# Patient Record
Sex: FEMALE | Race: WHITE | Employment: OTHER | URBAN - METROPOLITAN AREA
[De-identification: names, ages, dates, MRNs, and addresses within clinical notes are randomized per-mention and may not be internally consistent; named-entity substitution may affect disease eponyms.]

---

## 2020-08-24 ENCOUNTER — HOSPITAL ENCOUNTER (OUTPATIENT)
Dept: DIABETES SERVICES | Age: 60
Setting detail: THERAPIES SERIES
Discharge: HOME | End: 2020-08-24
Payer: COMMERCIAL

## 2020-08-24 VITALS — TEMPERATURE: 98.6 F

## 2020-08-24 PROCEDURE — G0109 DIAB MANAGE TRN IND/GROUP: HCPCS | Performed by: DIETITIAN, REGISTERED

## 2020-08-24 SDOH — ECONOMIC STABILITY: FOOD INSECURITY: ADDITIONAL INFORMATION: NO

## 2020-08-24 ASSESSMENT — PATIENT HEALTH QUESTIONNAIRE - PHQ9
2. FEELING DOWN, DEPRESSED OR HOPELESS: 0
SUM OF ALL RESPONSES TO PHQ9 QUESTIONS 1 & 2: 0
SUM OF ALL RESPONSES TO PHQ QUESTIONS 1-9: 0
1. LITTLE INTEREST OR PLEASURE IN DOING THINGS: 0
SUM OF ALL RESPONSES TO PHQ QUESTIONS 1-9: 0

## 2020-08-24 NOTE — PROGRESS NOTES
Demonstrate proper technique        Monitoring blood glucose, interpreting and using results:   -Identify recommended & personal blood glucose targets & HgbA1C target levels  -State the Importance of logging blood glucose levels for pattern recognition;   -State benefits of reading/using pt generated health data  -Verbalize safe lancet disposal 1 8/24/2020  4 Pt states not currently ordered to test, but has a glucose meter and testing supplies at home. She does occasionally check her glucose level.     -Demonstrate proper testing technique        Incorporating physical activity into lifestyle:   -State effect of exercise on blood glucose levels;   -State benefits of regular exercise;   -Define safety considerations;  -Describe contraindications/maintenance of activity. 1 8/24/2020  4 Pt states physically active 3x/week for 30-60 minutes/day--golfing, playing pool, walking. Incorporating nutritional management into lifestyle:   -Describe effect of type, amount & timing of food on blood glucose  -Describe methods for preparing and planning healthy meals  Correctly read food labels  8/24/2020      Plan personal carbohydrate-controlled meal based on individualized meal plan  Demonstrate CHO counting/portion control         Developing strategies for problem solving to promote health/change behavior. -Identify 7 self-care behaviors; Personal health risk factors; Benefits, challenges & strategies for behavioral change and set an individualized goal selection. 1 8/24/2020  4 Goal:  I will use the plate method at each meal to reduce my portions.       Identified Barriers to learning/adherence to self management plan:    None  []  other    Instruction Method:  Lecture/Discussion, Handouts and Return demonstration     Supporting Education Materials/Equipment Provided: Educational Binder and Nutritional Packet   []South African materials       [] services     []Other:      Encounter Type Date Attended Start Time End Time Comments No Show Dates   Assessment 8/24/2020 1330 1345       Session 1 8/24/2020 1345 1500      Session 2 8/24/2020 1500 1600     Session 3         In person Follow-up         Gestational Diabetes         Individual MNT        Meter Instrx        Insulin Instrx           Additional Comments:  Patient participated in class discussions. PCP notified of education via EMR.      Date:           DSMES Follow-up plan based on patients DSMES goal    Dr Notified by [] EMR []Fax        []HgbA1C   []Weight   []Hypertension  []Follow-up with Physician  []Medication compliance   []Plate method/meal plan compliance   []Self-Foot Exam Frequency   []Monitoring Frequency   []Exercise Routine   []Goal Attainment       []Patient lost to follow-up  Dr Notified by []EMR []Fax     Personal Support Plan:      [x] Keep all scheduled doctor appointments   [] Make and keep appointments with specialists (foot, eye, dentist) as recommended   [] Consult my pharmacist about all new medications or to ask any medication questions   [] Get tested for sleep apnea   [] Seek help for:   [] Make an appointment with:   [] Attend smoking cessation classes or call 1-800-QUIT-NOW  [] Attend Diabetes Support Group   [] Use diabetes magazines, books, or credible web-sites like the ADA for more information  [] Increase exercise at home or join an exercise program:   [] Other:

## 2020-08-24 NOTE — LETTER
[facility])- Diabetes Education    2020       Re:     Jody Bautista         :  1960  Dear Dr. Juan Jean: Thank you for referring your patient, Jody Bautista, for diabetes education. Your patient has completed her personalized initial comprehensive education plan. Your patient attended class on 20 that addressed the following topics:    Nursing/Medical [x]      Nutrition [x]  [] Diabetes disease process & treatment   [] Diabetes medication use and safety   [] Self-monitoring blood glucose/interpreting results  [] Prevention, detection and treatment of acute complications  [] Prevention, detection and treatment of chronic complications  [] Developing strategies to address psychosocial issues    [] Nutritional management: basic principles   [] Carbohydrate counting, plate method, label reading  [] Benefits of/ways to incorporate physical activity  [] Problem solving and preparing for crisis situations  [] Diabetes supply management  [] Goal setting and behavior modification       PHQ-9 Depression Screen Score:  0  0-4: Minimal Depression                5-9: Mild Depression    10-14: Moderate Depression  15-19: Moderately Severe Depression  20-27: Severe Depression     COMMENTS:      PATIENT SELECTED GOAL:   [x]  I will follow my meal plan and measure my carbohydrate foods. []  I will increase my activity to:  []  I will check my blood glucose as ordered by my doctor. []  I will take my medications at the correct times as ordered by my doctor.   []  Other:      DIABETES SELF-MANAGEMENT SUPPORT PLAN/REFERRALS (patient identified):  [x] Keep my scheduled visits with my doctor   [] Make and keep appointments with specialists (foot, eye, dentist) as recommended  [] Consult my pharmacist with all new medications and/or any medication questions  [] Get tested for sleep apnea  [] Seek help for:   [] Make an appointment with: [] Attend smoking cessation classes or call help-line (5-613-QUIT-NOW; 773.960.1176)  [] Attend a diabetes support group  [] Use diabetes magazines, books, or the American Diabetes Association website (www.diabetes. org) for more information    [] Start or increase exercising at home or join a program:  [] Other: There will be a follow-up in 3 months to evaluate the attainment of their chosen goal, and self identified support plan and you will be notified of their progress. Thank you for referring this patient to our program.  Please do not hesitate to call if you have any questions at [phone] [facility] or [facility]) or [phone] ([address] [place] [address]).         Sincerely,    Diabetes Educators:     []  JEAN Ortiz      [x]  Heber Real RN BSN ALEYDA     [x]  RICHARD Bonner         []  Lakhwinder Pepper MS RICHARD LD   []  Mike EVERETT  []  RICHARD West        Diabetes

## 2020-08-24 NOTE — PROGRESS NOTES
Diabetes Self-Management Education Record    Participant Name: Ligia Grace  Referring Provider: Justo Garza MD  Assessment/Evaluation Ratings:  1=Needs Instruction   4=Demonstrates Understanding/Competency  2=Needs Review   NC=Not Covered    3=Comprehends Key Points  N/A=Not Applicable  Topics/Learning Objectives Pre-session Assess Date:  8/24/2020 Instr. Date Follow-up Date Post- session Eval Comments   Diabetes disease process & Treatment process:   -Define diabetes & prediabetes and ABCs of     diabetes   -Identify own type of diabetes  -Identify lifestyle changes/treatment options 1 8/24/2020  4 Type 2, A1C 7%  Family History   Developing strategies for Healthy coping/psychosocial issues:    -Describe feelings about living with diabetes  -Identify coping strategies;   -Identify support needed & support network available 1 8/24/2020  4     8/24/20  PHQ-2 Depression Screen Score: 0       Prevention, detection & treatment of Chronic complications:    -Identify the prevention, detection and treatment for complications including immunizations, preventive eye, foot, dental and renal exams as indicated per the participant's duration of diabetes and health status.  -Define the natural course of diabetes and the relationship of blood glucose levels to long term complications of diabetes.   1 8/24/2020  4    Prevention, detection & treatment of acute complications:    -List symptoms of hyper & hypoglycemia, and prevention & treatment strategies.   -Describe sick day guidelines  DKA /indications for ketone testing &  when to call physician  1 8/24/2020  4    Identify severe weather/situation crisis  & diabetes supplies management 1 8/24/2020  4    Using medications safely:   -State effects of diabetes medicines on blood glucose levels;  -List diabetes medication taken, action & side effects 1 8/24/2020  4 Metformin 1000 mg BID   Insulin/Injectables  -Name appropriate injection sites; proper storage; supplies needed;  N/A Demonstrate proper technique        Monitoring blood glucose, interpreting and using results:   -Identify recommended & personal blood glucose targets & HgbA1C target levels  -State the Importance of logging blood glucose levels for pattern recognition;   -State benefits of reading/using pt generated health data  -Verbalize safe lancet disposal 1 8/24/2020  4 Pt states not currently ordered to test, but has a glucose meter and testing supplies at home. She does occasionally check her glucose level.     -Demonstrate proper testing technique        Incorporating physical activity into lifestyle:   -State effect of exercise on blood glucose levels;   -State benefits of regular exercise;   -Define safety considerations;  -Describe contraindications/maintenance of activity. 1 8/24/2020  4 Pt states physically active 3x/week for 30-60 minutes/day--golfing, playing pool, walking. Incorporating nutritional management into lifestyle:   -Describe effect of type, amount & timing of food on blood glucose  -Describe methods for preparing and planning healthy meals  Correctly read food labels  8/24/2020  3 Discussed general healthy eating guidelines, plate method, food groups high in carbohydrate, portion control and timing of meals. Plan personal carbohydrate-controlled meal based on individualized meal plan  Demonstrate CHO counting/portion control         Developing strategies for problem solving to promote health/change behavior. -Identify 7 self-care behaviors; Personal health risk factors; Benefits, challenges & strategies for behavioral change and set an individualized goal selection. 1 8/24/2020  4 Goal:  I will use the plate method at each meal to reduce my portions.       Identified Barriers to learning/adherence to self management plan:    None  []  other    Instruction Method:  Lecture/Discussion, Handouts and Return demonstration     Supporting Education Materials/Equipment Provided: Educational Binder and Nutritional Packet   [][place] materials       [] services     []Other:      Encounter Type Date Attended Start Time End Time Comments No Show Dates   Assessment 8/24/2020 1330 1345       Session 1 8/24/2020 1345 1500      Session 2 8/24/2020 1500 1600     Session 3         In person Follow-up         Gestational Diabetes         Individual MNT        Meter Instrx        Insulin Instrx           Additional Comments:  Patient participated in class discussions. PCP notified of education via EMR.      Date:           DSMES Follow-up plan based on patients DSMES goal    Dr Notified by [] EMR []Fax        []HgbA1C   []Weight   []Hypertension  []Follow-up with Physician  []Medication compliance   []Plate method/meal plan compliance   []Self-Foot Exam Frequency   []Monitoring Frequency   []Exercise Routine   []Goal Attainment       []Patient lost to follow-up  Dr Notified by []EMR []Fax     Personal Support Plan:      [x] Keep all scheduled doctor appointments   [] Make and keep appointments with specialists (foot, eye, dentist) as recommended   [] Consult my pharmacist about all new medications or to ask any medication questions   [] Get tested for sleep apnea   [] Seek help for:   [] Make an appointment with:   [] Attend smoking cessation classes or call 5-800-QUIT-NOW  [] Attend Diabetes Support Group   [] Use diabetes magazines, books, or credible web-sites like the ADA for more information  [] Increase exercise at home or join an exercise program:   [] Other:

## 2020-08-24 NOTE — PROGRESS NOTES
Diabetes Self-Management Education Record    Participant Name: Reyna Bonilla  Referring Provider: Shelby Gandara MD  Assessment/Evaluation Ratings:  1=Needs Instruction   4=Demonstrates Understanding/Competency  2=Needs Review   NC=Not Covered    3=Comprehends Key Points  N/A=Not Applicable  Topics/Learning Objectives Pre-session Assess Date:  8/24/2020 Instr. Date Follow-up Date Post- session Eval Comments   Diabetes disease process & Treatment process:   -Define diabetes & prediabetes and ABCs of     diabetes   -Identify own type of diabetes  -Identify lifestyle changes/treatment options 1 8/24/2020   Type 2  Family History   Developing strategies for Healthy coping/psychosocial issues:    -Describe feelings about living with diabetes  -Identify coping strategies;   -Identify support needed & support network available 1 8/24/2020         PHQ-2 Depression Screen Score: 0       Prevention, detection & treatment of Chronic complications:    -Identify the prevention, detection and treatment for complications including immunizations, preventive eye, foot, dental and renal exams as indicated per the participant's duration of diabetes and health status.  -Define the natural course of diabetes and the relationship of blood glucose levels to long term complications of diabetes.    8/24/2020      Prevention, detection & treatment of acute complications:    -List symptoms of hyper & hypoglycemia, and prevention & treatment strategies.   -Describe sick day guidelines  DKA /indications for ketone testing &  when to call physician   8/24/2020      Identify severe weather/situation crisis  & diabetes supplies management  8/24/2020      Using medications safely:   -State effects of diabetes medicines on blood glucose levels;  -List diabetes medication taken, action & side effects  8/24/2020   Metformin   Insulin/Injectables  -Name appropriate injection sites; proper storage; supplies needed;          Demonstrate proper technique Monitoring blood glucose, interpreting and using results:   -Identify recommended & personal blood glucose targets & HgbA1C target levels  -State the Importance of logging blood glucose levels for pattern recognition;   -State benefits of reading/using pt generated health data  -Verbalize safe lancet disposal  8/24/2020   Pt states not currently testing  A1C 7%   -Demonstrate proper testing technique        Incorporating physical activity into lifestyle:   -State effect of exercise on blood glucose levels;   -State benefits of regular exercise;   -Define safety considerations;  -Describe contraindications/maintenance of activity. 1 8/24/2020   Pt states physically active 3x/week for 30-60 minutes/day. Incorporating nutritional management into lifestyle:   -Describe effect of type, amount & timing of food on blood glucose  -Describe methods for preparing and planning healthy meals  Correctly read food labels  8/24/2020      Plan personal carbohydrate-controlled meal based on individualized meal plan  Demonstrate CHO counting/portion control         Developing strategies for problem solving to promote health/change behavior. -Identify 7 self-care behaviors; Personal health risk factors; Benefits, challenges & strategies for behavioral change and set an individualized goal selection.   8/24/2020   Goal:     Identified Barriers to learning/adherence to self management plan:    None  []  other    Instruction Method:  Lecture/Discussion, Handouts and Return demonstration     Supporting Education Materials/Equipment Provided: Educational Binder and Nutritional Packet   []Lithuanian materials       [] services     []Other:      Encounter Type Date Attended Start Time End Time Comments No Show Dates   Assessment 8/24/2020 1330        Session 1         Session 2        Session 3         In person Follow-up         Gestational Diabetes         Individual MNT        Meter Instrx        Insulin Instrx Additional Comments:    Date:           DSMES Follow-up plan based on patients DSMES goal    Dr Notified by [] EMR []Fax        []HgbA1C   []Weight   []Hypertension  []Follow-up with Physician  []Medication compliance   []Plate method/meal plan compliance   []Self-Foot Exam Frequency   []Monitoring Frequency   []Exercise Routine   []Goal Attainment       []Patient lost to follow-up  Dr Notified by []EMR []Fax     Personal Support Plan:      [x] Keep all scheduled doctor appointments   [] Make and keep appointments with specialists (foot, eye, dentist) as recommended   [] Consult my pharmacist about all new medications or to ask any medication questions   [] Get tested for sleep apnea   [] Seek help for:   [] Make an appointment with:   [] Attend smoking cessation classes or call 1-800-QUIT-NOW  [] Attend Diabetes Support Group   [] Use diabetes magazines, books, or credible web-sites like the ADA for more information  [] Increase exercise at home or join an exercise program:   [] Other:

## 2021-04-10 ENCOUNTER — APPOINTMENT (OUTPATIENT)
Dept: GENERAL RADIOLOGY | Age: 61
End: 2021-04-10
Payer: COMMERCIAL

## 2021-04-10 ENCOUNTER — HOSPITAL ENCOUNTER (EMERGENCY)
Age: 61
Discharge: HOME | End: 2021-04-10
Attending: EMERGENCY MEDICINE
Payer: COMMERCIAL

## 2021-04-10 ENCOUNTER — APPOINTMENT (OUTPATIENT)
Dept: CT IMAGING | Age: 61
End: 2021-04-10
Payer: COMMERCIAL

## 2021-04-10 VITALS
BODY MASS INDEX: 24.35 KG/M2 | OXYGEN SATURATION: 95 % | WEIGHT: 124 LBS | TEMPERATURE: 97.3 F | DIASTOLIC BLOOD PRESSURE: 104 MMHG | RESPIRATION RATE: 16 BRPM | SYSTOLIC BLOOD PRESSURE: 181 MMHG | HEART RATE: 70 BPM | HEIGHT: 60 IN

## 2021-04-10 DIAGNOSIS — K76.89 HEPATIC CYST: ICD-10-CM

## 2021-04-10 DIAGNOSIS — R07.9 RIGHT-SIDED CHEST PAIN: Primary | ICD-10-CM

## 2021-04-10 LAB
ALBUMIN SERPL-MCNC: 4.3 G/DL (ref 3.5–5.2)
ALP BLD-CCNC: 73 U/L (ref 35–104)
ALT SERPL-CCNC: 9 U/L (ref 0–32)
ANION GAP SERPL CALCULATED.3IONS-SCNC: 13 MMOL/L (ref 7–16)
AST SERPL-CCNC: 14 U/L (ref 0–31)
BASOPHILS ABSOLUTE: 0.05 E9/L (ref 0–0.2)
BASOPHILS RELATIVE PERCENT: 0.5 % (ref 0–2)
BILIRUB SERPL-MCNC: <0.2 MG/DL (ref 0–1.2)
BUN BLDV-MCNC: 17 MG/DL (ref 8–23)
CALCIUM SERPL-MCNC: 9.8 MG/DL (ref 8.6–10.2)
CHLORIDE BLD-SCNC: 103 MMOL/L (ref 98–107)
CO2: 26 MMOL/L (ref 22–29)
CREAT SERPL-MCNC: 0.8 MG/DL (ref 0.5–1)
D DIMER: 295 NG/ML DDU
EKG ATRIAL RATE: 72 BPM
EKG P AXIS: 59 DEGREES
EKG P-R INTERVAL: 182 MS
EKG Q-T INTERVAL: 412 MS
EKG QRS DURATION: 74 MS
EKG QTC CALCULATION (BAZETT): 451 MS
EKG R AXIS: 16 DEGREES
EKG T AXIS: 37 DEGREES
EKG VENTRICULAR RATE: 72 BPM
EOSINOPHILS ABSOLUTE: 0.14 E9/L (ref 0.05–0.5)
EOSINOPHILS RELATIVE PERCENT: 1.4 % (ref 0–6)
GFR AFRICAN AMERICAN: >60
GFR NON-AFRICAN AMERICAN: >60 ML/MIN/1.73
GLUCOSE BLD-MCNC: 119 MG/DL (ref 74–99)
HCT VFR BLD CALC: 38 % (ref 34–48)
HEMOGLOBIN: 12.3 G/DL (ref 11.5–15.5)
IMMATURE GRANULOCYTES #: 0.04 E9/L
IMMATURE GRANULOCYTES %: 0.4 % (ref 0–5)
LIPASE: 39 U/L (ref 13–60)
LYMPHOCYTES ABSOLUTE: 1.62 E9/L (ref 1.5–4)
LYMPHOCYTES RELATIVE PERCENT: 16.7 % (ref 20–42)
MCH RBC QN AUTO: 25.5 PG (ref 26–35)
MCHC RBC AUTO-ENTMCNC: 32.4 % (ref 32–34.5)
MCV RBC AUTO: 78.8 FL (ref 80–99.9)
MONOCYTES ABSOLUTE: 0.51 E9/L (ref 0.1–0.95)
MONOCYTES RELATIVE PERCENT: 5.3 % (ref 2–12)
NEUTROPHILS ABSOLUTE: 7.32 E9/L (ref 1.8–7.3)
NEUTROPHILS RELATIVE PERCENT: 75.7 % (ref 43–80)
PDW BLD-RTO: 15.4 FL (ref 11.5–15)
PLATELET # BLD: 371 E9/L (ref 130–450)
PMV BLD AUTO: 11.1 FL (ref 7–12)
POTASSIUM REFLEX MAGNESIUM: 3.9 MMOL/L (ref 3.5–5)
RBC # BLD: 4.82 E12/L (ref 3.5–5.5)
SODIUM BLD-SCNC: 142 MMOL/L (ref 132–146)
TOTAL PROTEIN: 6.8 G/DL (ref 6.4–8.3)
WBC # BLD: 9.7 E9/L (ref 4.5–11.5)

## 2021-04-10 PROCEDURE — 96375 TX/PRO/DX INJ NEW DRUG ADDON: CPT

## 2021-04-10 PROCEDURE — 85025 COMPLETE CBC W/AUTO DIFF WBC: CPT

## 2021-04-10 PROCEDURE — 71275 CT ANGIOGRAPHY CHEST: CPT

## 2021-04-10 PROCEDURE — 6360000004 HC RX CONTRAST MEDICATION: Performed by: RADIOLOGY

## 2021-04-10 PROCEDURE — 96374 THER/PROPH/DIAG INJ IV PUSH: CPT

## 2021-04-10 PROCEDURE — 83690 ASSAY OF LIPASE: CPT

## 2021-04-10 PROCEDURE — 6360000002 HC RX W HCPCS: Performed by: EMERGENCY MEDICINE

## 2021-04-10 PROCEDURE — 93005 ELECTROCARDIOGRAM TRACING: CPT | Performed by: EMERGENCY MEDICINE

## 2021-04-10 PROCEDURE — 71045 X-RAY EXAM CHEST 1 VIEW: CPT

## 2021-04-10 PROCEDURE — 93010 ELECTROCARDIOGRAM REPORT: CPT | Performed by: INTERNAL MEDICINE

## 2021-04-10 PROCEDURE — 99283 EMERGENCY DEPT VISIT LOW MDM: CPT

## 2021-04-10 PROCEDURE — 80053 COMPREHEN METABOLIC PANEL: CPT

## 2021-04-10 PROCEDURE — 85378 FIBRIN DEGRADE SEMIQUANT: CPT

## 2021-04-10 RX ORDER — ORPHENADRINE CITRATE 30 MG/ML
30 INJECTION INTRAMUSCULAR; INTRAVENOUS ONCE
Status: COMPLETED | OUTPATIENT
Start: 2021-04-10 | End: 2021-04-10

## 2021-04-10 RX ORDER — KETOROLAC TROMETHAMINE 30 MG/ML
15 INJECTION, SOLUTION INTRAMUSCULAR; INTRAVENOUS ONCE
Status: COMPLETED | OUTPATIENT
Start: 2021-04-10 | End: 2021-04-10

## 2021-04-10 RX ORDER — KETOROLAC TROMETHAMINE 10 MG/1
10 TABLET, FILM COATED ORAL EVERY 8 HOURS PRN
Qty: 15 TABLET | Refills: 0 | Status: SHIPPED | OUTPATIENT
Start: 2021-04-10 | End: 2022-09-13 | Stop reason: ALTCHOICE

## 2021-04-10 RX ORDER — ORPHENADRINE CITRATE 100 MG/1
100 TABLET, EXTENDED RELEASE ORAL 2 TIMES DAILY
Qty: 20 TABLET | Refills: 0 | Status: SHIPPED | OUTPATIENT
Start: 2021-04-10 | End: 2021-04-20

## 2021-04-10 RX ADMIN — IOPAMIDOL 65 ML: 755 INJECTION, SOLUTION INTRAVENOUS at 06:33

## 2021-04-10 RX ADMIN — KETOROLAC TROMETHAMINE 15 MG: 30 INJECTION, SOLUTION INTRAMUSCULAR; INTRAVENOUS at 05:00

## 2021-04-10 RX ADMIN — ORPHENADRINE CITRATE 30 MG: 30 INJECTION INTRAMUSCULAR; INTRAVENOUS at 05:00

## 2021-04-10 ASSESSMENT — PAIN SCALES - GENERAL
PAINLEVEL_OUTOF10: 7
PAINLEVEL_OUTOF10: 7

## 2021-04-10 ASSESSMENT — PAIN DESCRIPTION - PAIN TYPE: TYPE: ACUTE PAIN

## 2021-04-10 NOTE — ED PROVIDER NOTES
identified pulmonary embolism. Mild dependent atelectasis in the lungs. Partial visualization of low-density hepatic and renal lesions. These may   represent multiple cysts. However, for more definitive assessment, follow-up   hepatic mass protocol CT could be performed on a non-emergent basis. Alternatively, comparison with any previous exam would be useful. XR CHEST PORTABLE   Final Result   No acute cardiopulmonary findings. PA and lateral views would be useful for   further assessment, if symptoms persist.             ------------------------- NURSING NOTES AND VITALS REVIEWED ---------------------------   The nursing notes within the ED encounter and vital signs as below have been reviewed. BP (!) 181/104   Pulse 70   Temp 97.3 °F (36.3 °C)   Resp 16   Ht 5' (1.524 m)   Wt 124 lb (56.2 kg)   LMP 07/09/2009   SpO2 95%   BMI 24.22 kg/m²   Oxygen Saturation Interpretation: Normal      ---------------------------------------------------PHYSICAL EXAM--------------------------------------      Constitutional/General: Alert and oriented x3, well appearing, non toxic in mild distress secondary to pain  Head: NC/AT  Eyes: PERRL, EOMI  Mouth: Oropharynx clear, handling secretions, no trismus  Neck: Supple, full ROM, no meningeal signs  Pulmonary: Lungs clear to auscultation bilaterally, no wheezes, rales, or rhonchi. Not in respiratory distress  Cardiovascular:  Regular rate and rhythm, no murmurs, gallops, or rubs. 2+ distal pulses  Abdomen: Soft, non tender, non distended,   Extremities: Moves all extremities x 4.  Warm and well perfused  Skin: warm and dry without rash  Neurologic: GCS 15,  Psych: Normal Affect  Right shoulder mild tenderness to palpation over the right rhomboid region but not worse with full range of motion    ------------------------------ ED COURSE/MEDICAL DECISION MAKING----------------------  Medications   ketorolac (TORADOL) injection 15 mg (15 mg Intravenous Given 4/10/21 0500)   orphenadrine (NORFLEX) injection 30 mg (30 mg Intravenous Given 4/10/21 0500)   iopamidol (ISOVUE-370) 76 % injection 65 mL (65 mLs Intravenous Given 4/10/21 5767)         Medical Decision Making:    Suspected scapulothoracic syndrome but rule out PE because she has increased pain with breathing    Counseling: The emergency provider has spoken with the patient and discussed todays results, in addition to providing specific details for the plan of care and counseling regarding the diagnosis and prognosis. Questions are answered at this time and they are agreeable with the plan.      --------------------------------- IMPRESSION AND DISPOSITION ---------------------------------    IMPRESSION  1. Right-sided chest pain    2.  Hepatic cyst        DISPOSITION  Disposition: Discharge to home  Patient condition is stable                  Rick Alegria MD  04/12/21 0931

## 2021-04-15 ENCOUNTER — HOSPITAL ENCOUNTER (EMERGENCY)
Age: 61
Discharge: HOME | End: 2021-04-15
Attending: EMERGENCY MEDICINE
Payer: COMMERCIAL

## 2021-04-15 ENCOUNTER — APPOINTMENT (OUTPATIENT)
Dept: GENERAL RADIOLOGY | Age: 61
End: 2021-04-15
Payer: COMMERCIAL

## 2021-04-15 VITALS
WEIGHT: 116 LBS | RESPIRATION RATE: 16 BRPM | HEART RATE: 68 BPM | TEMPERATURE: 98.7 F | BODY MASS INDEX: 22.78 KG/M2 | SYSTOLIC BLOOD PRESSURE: 197 MMHG | DIASTOLIC BLOOD PRESSURE: 83 MMHG | OXYGEN SATURATION: 97 % | HEIGHT: 60 IN

## 2021-04-15 DIAGNOSIS — M54.6 RIGHT-SIDED THORACIC BACK PAIN, UNSPECIFIED CHRONICITY: Primary | ICD-10-CM

## 2021-04-15 LAB
ANION GAP SERPL CALCULATED.3IONS-SCNC: 10 MMOL/L (ref 7–16)
BASOPHILS ABSOLUTE: 0.04 E9/L (ref 0–0.2)
BASOPHILS RELATIVE PERCENT: 0.5 % (ref 0–2)
BUN BLDV-MCNC: 24 MG/DL (ref 8–23)
CALCIUM SERPL-MCNC: 9.1 MG/DL (ref 8.6–10.2)
CHLORIDE BLD-SCNC: 103 MMOL/L (ref 98–107)
CO2: 27 MMOL/L (ref 22–29)
CREAT SERPL-MCNC: 1 MG/DL (ref 0.5–1)
EKG ATRIAL RATE: 76 BPM
EKG P AXIS: 49 DEGREES
EKG P-R INTERVAL: 182 MS
EKG Q-T INTERVAL: 430 MS
EKG QRS DURATION: 74 MS
EKG QTC CALCULATION (BAZETT): 483 MS
EKG R AXIS: 2 DEGREES
EKG T AXIS: 25 DEGREES
EKG VENTRICULAR RATE: 76 BPM
EOSINOPHILS ABSOLUTE: 0.2 E9/L (ref 0.05–0.5)
EOSINOPHILS RELATIVE PERCENT: 2.6 % (ref 0–6)
GFR AFRICAN AMERICAN: >60
GFR NON-AFRICAN AMERICAN: 56 ML/MIN/1.73
GLUCOSE BLD-MCNC: 119 MG/DL (ref 74–99)
HCT VFR BLD CALC: 36.6 % (ref 34–48)
HEMOGLOBIN: 11.8 G/DL (ref 11.5–15.5)
IMMATURE GRANULOCYTES #: 0.02 E9/L
IMMATURE GRANULOCYTES %: 0.3 % (ref 0–5)
LYMPHOCYTES ABSOLUTE: 1.8 E9/L (ref 1.5–4)
LYMPHOCYTES RELATIVE PERCENT: 23.8 % (ref 20–42)
MCH RBC QN AUTO: 25.2 PG (ref 26–35)
MCHC RBC AUTO-ENTMCNC: 32.2 % (ref 32–34.5)
MCV RBC AUTO: 78 FL (ref 80–99.9)
MONOCYTES ABSOLUTE: 0.6 E9/L (ref 0.1–0.95)
MONOCYTES RELATIVE PERCENT: 7.9 % (ref 2–12)
NEUTROPHILS ABSOLUTE: 4.91 E9/L (ref 1.8–7.3)
NEUTROPHILS RELATIVE PERCENT: 64.9 % (ref 43–80)
PDW BLD-RTO: 15.4 FL (ref 11.5–15)
PLATELET # BLD: 365 E9/L (ref 130–450)
PMV BLD AUTO: 11.2 FL (ref 7–12)
POTASSIUM REFLEX MAGNESIUM: 3.6 MMOL/L (ref 3.5–5)
RBC # BLD: 4.69 E12/L (ref 3.5–5.5)
SODIUM BLD-SCNC: 140 MMOL/L (ref 132–146)
TROPONIN: <0.01 NG/ML (ref 0–0.03)
WBC # BLD: 7.6 E9/L (ref 4.5–11.5)

## 2021-04-15 PROCEDURE — 85025 COMPLETE CBC W/AUTO DIFF WBC: CPT

## 2021-04-15 PROCEDURE — 71046 X-RAY EXAM CHEST 2 VIEWS: CPT

## 2021-04-15 PROCEDURE — 84484 ASSAY OF TROPONIN QUANT: CPT

## 2021-04-15 PROCEDURE — 80048 BASIC METABOLIC PNL TOTAL CA: CPT

## 2021-04-15 PROCEDURE — 99283 EMERGENCY DEPT VISIT LOW MDM: CPT

## 2021-04-15 PROCEDURE — 93005 ELECTROCARDIOGRAM TRACING: CPT | Performed by: EMERGENCY MEDICINE

## 2021-04-15 RX ORDER — METHYLPREDNISOLONE 4 MG/1
TABLET ORAL
Qty: 1 KIT | Status: SHIPPED | OUTPATIENT
Start: 2021-04-15 | End: 2021-04-21

## 2021-04-15 ASSESSMENT — PAIN SCALES - GENERAL: PAINLEVEL_OUTOF10: 7

## 2021-04-15 ASSESSMENT — ENCOUNTER SYMPTOMS
SHORTNESS OF BREATH: 0
DIARRHEA: 0
EYE PAIN: 0
EYE DISCHARGE: 0
COUGH: 0
ABDOMINAL DISTENTION: 0
ABDOMINAL PAIN: 0
HEARTBURN: 0
NAUSEA: 0
ORTHOPNEA: 0
SORE THROAT: 0
EYE REDNESS: 0
BACK PAIN: 1
VOMITING: 0
WHEEZING: 0
SINUS PRESSURE: 0

## 2021-04-15 ASSESSMENT — PAIN DESCRIPTION - LOCATION: LOCATION: BACK;CHEST

## 2021-04-15 NOTE — ED PROVIDER NOTES
53-year-old female presents to the emergency department with right-sided chest pain that she states began on Friday and was evaluated in the emergency department at that time for it. She had complete work-up including CTA of the chest that was found to be negative. Patient does have history of prediabetes, hyperlipidemia, hypertension. The patient states that she was concerned today because there is pain down the right side of her shoulder. She denies shortness of breath denies cough denies fevers denies nausea vomiting diarrhea abdominal pain urinary symptoms or leg swelling. She states she contacted her PCP who sent her in for further evaluation. The history is provided by the patient. Chest Pain  Pain location:  R chest  Pain quality: aching    Pain radiates to:  R shoulder  Pain severity:  Mild  Onset quality:  Gradual  Duration:  1 month  Timing:  Intermittent  Progression:  Waxing and waning  Chronicity:  New  Relieved by:  Nothing  Worsened by:  Nothing  Ineffective treatments:  None tried  Associated symptoms: back pain    Associated symptoms: no abdominal pain, no anxiety, no cough, no diaphoresis, no dizziness, no fatigue, no fever, no headache, no heartburn, no lower extremity edema, no nausea, no numbness, no orthopnea, no palpitations, no shortness of breath, no syncope, no vomiting and no weakness         Review of Systems   Constitutional: Negative for chills, diaphoresis, fatigue and fever. HENT: Negative for ear pain, sinus pressure and sore throat. Eyes: Negative for pain, discharge and redness. Respiratory: Negative for cough, shortness of breath and wheezing. Cardiovascular: Positive for chest pain. Negative for palpitations, orthopnea and syncope. Gastrointestinal: Negative for abdominal distention, abdominal pain, diarrhea, heartburn, nausea and vomiting. Genitourinary: Negative for dysuria and frequency. Musculoskeletal: Positive for back pain.  Negative for arthralgias. Skin: Negative for rash and wound. Neurological: Negative for dizziness, weakness, numbness and headaches. Hematological: Negative for adenopathy. All other systems reviewed and are negative. Physical Exam  Constitutional:       Appearance: Normal appearance. HENT:      Head: Normocephalic and atraumatic. Eyes:      Extraocular Movements: Extraocular movements intact. Pupils: Pupils are equal, round, and reactive to light. Cardiovascular:      Rate and Rhythm: Normal rate and regular rhythm. Pulses: Normal pulses. Heart sounds: Normal heart sounds. Pulmonary:      Breath sounds: Normal breath sounds. Abdominal:      General: Abdomen is flat. Bowel sounds are normal.      Palpations: Abdomen is soft. Musculoskeletal:        Back:    Neurological:      General: No focal deficit present. Mental Status: She is alert and oriented to person, place, and time. Procedures   EKG: This EKG is signed and interpreted by the EP. Time: 0:38  Rate: 76  Rhythm: Sinus  Interpretation: NSR  Comparison: stable as compared to patient's most recent EKG    MDM  Number of Diagnoses or Management Options  Right-sided thoracic back pain, unspecified chronicity  Diagnosis management comments: Patient seen and examined. Patient symptoms seem musculoskeletal related. I did review imaging and work-up done that was found to be reassuring. Patient already on muscle relaxers and anti-inflammatories.   After review of work-up was found to be reassuring patient was discharged with additional course of steroids she is recommended to follow-up with primary care physician for further work-up.             --------------------------------------------- PAST HISTORY ---------------------------------------------  Past Medical History:  has a past medical history of Abnormal glandular Papanicolaou smear of cervix, Diabetes mellitus ([facility] [address] History of colonoscopy, Hyperlipidemia, Hypertension, and Trauma. Past Surgical History:  has a past surgical history that includes Appendectomy; brain surgery; Abdominal exploration surgery; Colposcopy; external auditory canal reconstruction; Tonsillectomy; other surgical history; and Ovary removal (Right). Social History:  reports that she has been smoking cigarettes. She has never used smokeless tobacco. She reports that she does not drink alcohol or use drugs. Family History: family history includes High Cholesterol in her father and mother; Hypertension in her father and mother; Mult Sclerosis in her mother; Parkinsonism in her father; Stroke in her maternal grandmother and mother; Thyroid Disease in her mother. The patients home medications have been reviewed. Allergies: Patient has no known allergies.     -------------------------------------------------- RESULTS -------------------------------------------------  Labs:  Results for orders placed or performed during the hospital encounter of 04/15/21   CBC Auto Differential   Result Value Ref Range    WBC 7.6 4.5 - 11.5 E9/L    RBC 4.69 3.50 - 5.50 E12/L    Hemoglobin 11.8 11.5 - 15.5 g/dL    Hematocrit 36.6 34.0 - 48.0 %    MCV 78.0 (L) 80.0 - 99.9 fL    MCH 25.2 (L) 26.0 - 35.0 pg    MCHC 32.2 32.0 - 34.5 %    RDW 15.4 (H) 11.5 - 15.0 fL    Platelets 061 331 - 268 E9/L    MPV 11.2 7.0 - 12.0 fL    Neutrophils % 64.9 43.0 - 80.0 %    Immature Granulocytes % 0.3 0.0 - 5.0 %    Lymphocytes % 23.8 20.0 - 42.0 %    Monocytes % 7.9 2.0 - 12.0 %    Eosinophils % 2.6 0.0 - 6.0 %    Basophils % 0.5 0.0 - 2.0 %    Neutrophils Absolute 4.91 1.80 - 7.30 E9/L    Immature Granulocytes # 0.02 E9/L    Lymphocytes Absolute 1.80 1.50 - 4.00 E9/L    Monocytes Absolute 0.60 0.10 - 0.95 E9/L    Eosinophils Absolute 0.20 0.05 - 0.50 E9/L    Basophils Absolute 0.04 0.00 - 0.20 W2/K   Basic Metabolic Panel w/ Reflex to MG   Result Value Ref Range    Sodium 140 132 - 146 mmol/L    Potassium reflex Magnesium 3.6 3.5 - 5.0 mmol/L    Chloride 103 98 - 107 mmol/L    CO2 27 22 - 29 mmol/L    Anion Gap 10 7 - 16 mmol/L    Glucose 119 (H) 74 - 99 mg/dL    BUN 24 (H) 8 - 23 mg/dL    CREATININE 1.0 0.5 - 1.0 mg/dL    GFR Non-African American 56 >=60 mL/min/1.73    GFR African American >60     Calcium 9.1 8.6 - 10.2 mg/dL   Troponin   Result Value Ref Range    Troponin <0.01 0.00 - 0.03 ng/mL   EKG 12 Lead   Result Value Ref Range    Ventricular Rate 76 BPM    Atrial Rate 76 BPM    P-R Interval 182 ms    QRS Duration 74 ms    Q-T Interval 430 ms    QTc Calculation (Bazett) 483 ms    P Axis 49 degrees    R Axis 2 degrees    T Axis 25 degrees       Radiology:  XR CHEST (2 VW)   Final Result   No acute process or significant change. Short-term follow-up if symptoms   persist.             ------------------------- NURSING NOTES AND VITALS REVIEWED ---------------------------  Date / Time Roomed:  4/15/2021  1:13 AM  ED Bed Assignment:  07/07    The nursing notes within the ED encounter and vital signs as below have been reviewed. BP (!) 197/83   Pulse 68   Temp 98.7 °F (37.1 °C) (Temporal)   Resp 16   Ht 5' (1.524 m)   Wt 116 lb (52.6 kg)   LMP 07/09/2009   SpO2 97%   BMI 22.65 kg/m²   Oxygen Saturation Interpretation: Normal      ------------------------------------------ PROGRESS NOTES ------------------------------------------  I have spoken with the patient and discussed todays results, in addition to providing specific details for the plan of care and counseling regarding the diagnosis and prognosis. Their questions are answered at this time and they are agreeable with the plan. I discussed at length with them reasons for immediate return here for re evaluation. They will followup with their primary care physician by calling their office tomorrow.       --------------------------------- ADDITIONAL PROVIDER NOTES ---------------------------------  At this time the patient is without objective evidence of an acute process requiring hospitalization or inpatient management. They have remained hemodynamically stable throughout their entire ED visit and are stable for discharge with outpatient follow-up. The plan has been discussed in detail and they are aware of the specific conditions for emergent return, as well as the importance of follow-up. Discharge Medication List as of 4/15/2021  2:36 AM      START taking these medications    Details   methylPREDNISolone (MEDROL, MONIK,) 4 MG tablet Take by mouth., Disp-1 kit, R-zeroPrint             Diagnosis:  1. Right-sided thoracic back pain, unspecified chronicity        Disposition:  Patient's disposition: Discharge to home  Patient's condition is stable.          Alec Ayala DO  04/15/21 4906

## 2021-04-30 ENCOUNTER — OFFICE VISIT (OUTPATIENT)
Dept: HEMATOLOGY | Age: 61
End: 2021-04-30
Payer: COMMERCIAL

## 2021-04-30 VITALS
SYSTOLIC BLOOD PRESSURE: 161 MMHG | TEMPERATURE: 98.7 F | HEART RATE: 80 BPM | BODY MASS INDEX: 22.38 KG/M2 | OXYGEN SATURATION: 99 % | HEIGHT: 60 IN | DIASTOLIC BLOOD PRESSURE: 102 MMHG | WEIGHT: 114 LBS

## 2021-04-30 DIAGNOSIS — Q44.6 POLYCYSTIC LIVER DISEASE: Primary | ICD-10-CM

## 2021-04-30 DIAGNOSIS — K76.9 LIVER LESION: ICD-10-CM

## 2021-04-30 DIAGNOSIS — K82.4 GALLBLADDER POLYP: ICD-10-CM

## 2021-04-30 PROCEDURE — G8427 DOCREV CUR MEDS BY ELIG CLIN: HCPCS | Performed by: TRANSPLANT SURGERY

## 2021-04-30 PROCEDURE — G8420 CALC BMI NORM PARAMETERS: HCPCS | Performed by: TRANSPLANT SURGERY

## 2021-04-30 PROCEDURE — 4004F PT TOBACCO SCREEN RCVD TLK: CPT | Performed by: TRANSPLANT SURGERY

## 2021-04-30 PROCEDURE — 99204 OFFICE O/P NEW MOD 45 MIN: CPT | Performed by: TRANSPLANT SURGERY

## 2021-04-30 PROCEDURE — 99203 OFFICE O/P NEW LOW 30 MIN: CPT | Performed by: TRANSPLANT SURGERY

## 2021-04-30 PROCEDURE — 3017F COLORECTAL CA SCREEN DOC REV: CPT | Performed by: TRANSPLANT SURGERY

## 2021-04-30 NOTE — PROGRESS NOTES
Hepatobiliary and Pancreatic Surgery Attending History and Physical    Patient's Name/Date of Birth: Ludmila Kate /1960 (8 y.o.)    Date: April 30, 2021     CC: Polycystic liver disease with calcifications    HPI:  Patient is a very pleasant 56year old female whom denies any abdominal pain. She had a CT scan for an unrelated issue of her chest and she was found to have multiple hepatic cysts and one with calcifications. She had a subsequent ultrasound. She recently started to have diarrhea but denies any abdominal pain. She denies any history of polycystic liver nor kidney disease within her family. She is an occasional smoker and also has diabetes mellitus. Of note on her ultrasound she was found to have a 3mm polyp vs. Impacted gallstone within her gallbladder.     Past Medical History:   Diagnosis Date    Abnormal glandular Papanicolaou smear of cervix     Diabetes mellitus ([address]     History of colonoscopy 2007    neg    Hyperlipidemia     Hypertension     Trauma     hit by playing softball       Past Surgical History:   Procedure Laterality Date    ABDOMINAL EXPLORATION SURGERY      1981    APPENDECTOMY      BRAIN SURGERY      hematoma    COLPOSCOPY      EXTERNAL AUDITORY CANAL RECONSTRUCTION      many as child    OTHER SURGICAL HISTORY      EAR SURGERY    OVARY REMOVAL Right     TONSILLECTOMY         Current Outpatient Medications   Medication Sig Dispense Refill    ketorolac (TORADOL) 10 MG tablet Take 1 tablet by mouth every 8 hours as needed for Pain 15 tablet 0    metFORMIN (GLUCOPHAGE) 1000 MG tablet       atorvastatin (LIPITOR) 40 MG tablet       lisinopril-hydrochlorothiazide (PRINZIDE;ZESTORETIC) 10-12.5 MG per tablet       Nutritional Supplements (OSTEO ADVANCE PO) Take by mouth      Coenzyme Q10 (CO Q 10 PO) Take by mouth      MEGARED OMEGA-3 KRILL  MG CAPS Take by mouth      vitamin B-12 (CYANOCOBALAMIN) 1000 MCG tablet Take 1,000 mcg by mouth daily      Probiotic Product (PROBIOTIC DAILY PO) Take by mouth      Menaquinone-7 (VITAMIN K2) 40 MCG TABS Take by mouth      Cholecalciferol (VITAMIN D) 2000 UNITS CAPS capsule Take 2,000 Units by mouth 2 times daily      aspirin 81 MG chewable tablet Take 81 mg by mouth daily. No current facility-administered medications for this visit.         No Known Allergies    Family History   Problem Relation Age of Onset    Stroke Maternal Grandmother     Hypertension Father     High Cholesterol Father     Parkinsonism Father     Hypertension Mother     High Cholesterol Mother     Thyroid Disease Mother     Stroke Mother     Mult Sclerosis Mother        Social History     Socioeconomic History    Marital status: Single     Spouse name: Not on file    Number of children: 0    Years of education: 15    Highest education level: Not on file   Occupational History    Occupation: retired   Social Needs    Financial resource strain: Not on file    Food insecurity     Worry: Not on file     Inability: Not on file   [employer] needs     Medical: Not on file     Non-medical: Not on file   Tobacco Use    Smoking status: Light Tobacco Smoker     Types: Cigarettes    Smokeless tobacco: Never Used   Substance and Sexual Activity    Alcohol use: No    Drug use: No    Sexual activity: Yes     Partners: Male     Birth control/protection: Post-menopausal   Lifestyle    Physical activity     Days per week: Not on file     Minutes per session: Not on file    Stress: Not on file   Relationships    Social connections     Talks on phone: Not on file     Gets together: Not on file     Attends [employer] service: Not on file     Active member of club or organization: Not on file     Attends meetings of clubs or organizations: Not on file     Relationship status: Not on file    Intimate partner violence     Fear of current or ex partner: Not on file     Emotionally abused: Not on file     Physically abused: Not on file Forced sexual activity: Not on file   Other Topics Concern    Not on file   Social History Narrative    Not on file       ROS:   Review of Systems   Constitutional: Negative for chills, diaphoresis and fever. HENT: Negative for congestion, ear discharge, ear pain, hearing loss, nosebleeds and tinnitus. Eyes: Negative for photophobia, pain and discharge. Respiratory: Negative for shortness of breath. Cardiovascular: Negative for palpitations and leg swelling. Gastrointestinal: Negative for abdominal pain, blood in stool, constipation, diarrhea, nausea and vomiting. Endocrine: Negative for polydipsia. Genitourinary: Negative for frequency, hematuria and urgency. Musculoskeletal: Negative for back pain and neck pain. Skin: Negative for rash. Allergic/Immunologic: Negative for environmental allergies. Neurological: Negative for tremors and seizures. Psychiatric/Behavioral: Negative for hallucinations and suicidal ideas. The patient is not nervous/anxious. Physical Exam:  Vitals:    04/30/21 1310   BP: (!) 161/102   Pulse: 80   Temp: 98.7 °F (37.1 °C)   SpO2: 99%       PSYCH: mood and affect normal, alert and oriented x 3: No apparent distress, comfortable  EYES: Sclera white, pupils equal round and reactive to light  ENMT:  Hearing normal, trachea midline, ears externally intact  LYMPH: no obvious lympadenopathy in neck. RESP: Respiratory effort was normal with no retractions or use of accessory muscles.   CV:  No pedal edema  GI/ Abdomen: Soft, nondistended, nontender, no guarding, no peritoneal signs  MSK: no clubbing/ no cyanosis/ gaitnormal       Assessment/Plan:  3mm polyp vs. Impacted gallstone in the gallbladder and polycystic liver disease with calcifications  - I reviewed her images prior to her our office visit and we also reviewed them together today  - US 6 months secondary to polyp    45 Minutes of which greater than 50% was spent counseling or coordinating her care.    Thank you for the consultation allowing me to take part in Ms. Mahmood's care. Please send a copy of my note to Dr. Hayden Manning.  Collin Hightower M.D.  4/30/2021  1:21 PM

## 2021-05-02 ASSESSMENT — ENCOUNTER SYMPTOMS
PHOTOPHOBIA: 0
DIARRHEA: 0
EYE PAIN: 0
NAUSEA: 0
ABDOMINAL PAIN: 0
SHORTNESS OF BREATH: 0
VOMITING: 0
BLOOD IN STOOL: 0
BACK PAIN: 0
EYE DISCHARGE: 0
CONSTIPATION: 0

## 2021-10-20 ENCOUNTER — HOSPITAL ENCOUNTER (OUTPATIENT)
Dept: ULTRASOUND IMAGING | Age: 61
Discharge: HOME | End: 2021-10-22
Payer: COMMERCIAL

## 2021-10-20 DIAGNOSIS — K82.4 GALLBLADDER POLYP: ICD-10-CM

## 2021-10-20 PROCEDURE — 76705 ECHO EXAM OF ABDOMEN: CPT

## 2021-10-22 ENCOUNTER — OFFICE VISIT (OUTPATIENT)
Dept: HEMATOLOGY | Age: 61
End: 2021-10-22
Payer: COMMERCIAL

## 2021-10-22 VITALS
HEART RATE: 69 BPM | DIASTOLIC BLOOD PRESSURE: 82 MMHG | WEIGHT: 114 LBS | TEMPERATURE: 98.1 F | SYSTOLIC BLOOD PRESSURE: 130 MMHG | OXYGEN SATURATION: 100 % | HEIGHT: 60 IN | BODY MASS INDEX: 22.38 KG/M2

## 2021-10-22 DIAGNOSIS — Q44.6 POLYCYSTIC LIVER DISEASE: ICD-10-CM

## 2021-10-22 DIAGNOSIS — K82.4 POLYP OF GALLBLADDER: Primary | ICD-10-CM

## 2021-10-22 PROCEDURE — G8484 FLU IMMUNIZE NO ADMIN: HCPCS | Performed by: TRANSPLANT SURGERY

## 2021-10-22 PROCEDURE — G8427 DOCREV CUR MEDS BY ELIG CLIN: HCPCS | Performed by: TRANSPLANT SURGERY

## 2021-10-22 PROCEDURE — 4004F PT TOBACCO SCREEN RCVD TLK: CPT | Performed by: TRANSPLANT SURGERY

## 2021-10-22 PROCEDURE — 99213 OFFICE O/P EST LOW 20 MIN: CPT | Performed by: TRANSPLANT SURGERY

## 2021-10-22 PROCEDURE — G8420 CALC BMI NORM PARAMETERS: HCPCS | Performed by: TRANSPLANT SURGERY

## 2021-10-22 PROCEDURE — 99212 OFFICE O/P EST SF 10 MIN: CPT | Performed by: TRANSPLANT SURGERY

## 2021-10-22 PROCEDURE — 3017F COLORECTAL CA SCREEN DOC REV: CPT | Performed by: TRANSPLANT SURGERY

## 2021-10-22 NOTE — PROGRESS NOTES
Hepatobiliary and Pancreatic Surgery Attending History and Physical    Patient's Name/Date of Birth: Radha Marvin /1960 (6 y.o.)    Date: October 22, 2021     CC: Polycystic liver disease with calcifications    HPI:  Patient is a very pleasant 56year old female whom denies any abdominal pain. She had a CT scan for an unrelated issue of her chest and she was found to have multiple hepatic cysts and one with calcifications. She is an occasional smoker and also has diabetes mellitus. Of note on her ultrasound she was found to have a 3mm polyp vs. Impacted gallstone within her gallbladder. She is seeing me in follow up from her ultrasound. Physical Exam:  /82   Pulse 69   Temp 98.1 °F (36.7 °C)   Ht 5' (1.524 m)   Wt 114 lb (51.7 kg)   LMP 07/09/2009   SpO2 100%   BMI 22.26 kg/m²     PSYCH: mood and affect normal, alert and oriented x 3: No apparent distress, comfortable  EYES: Sclera white, pupils equal round and reactive to light  ENMT:  Hearing normal, trachea midline, ears externally intact  LYMPH: no obvious lympadenopathy in neck. RESP: Respiratory effort was normal with no retractions or use of accessory muscles. CV:  No pedal edema  GI/ Abdomen: Soft, nondistended, nontender, no guarding, no peritoneal signs  MSK: no clubbing/ no cyanosis/ gaitnormal       Assessment/Plan:  3mm polyp vs. Impacted gallstone in the gallbladder and polycystic liver disease with calcifications  - I reviewed her images prior to her our office visit and we also reviewed them together today  - polyp has not changed in size and she remains asymptomatic  - US yearly due to polyp    20 Minutes of which greater than 50% was spent counseling or coordinating her care. Thank you for the consultation allowing me to take part in Ms. Mahmood's care. Please send a copy of my note to Dr. Mee Hilliard.  Christina Montejo M.D.  10/22/2021  2:15 PM

## 2022-09-23 ENCOUNTER — TELEPHONE (OUTPATIENT)
Dept: HEMATOLOGY | Age: 62
End: 2022-09-23

## 2022-09-23 NOTE — TELEPHONE ENCOUNTER
Spoke to Javan about her US appt informations. She confirmed appt and expressed understanding of instructions. A follow up appt was made.

## 2022-10-13 ENCOUNTER — HOSPITAL ENCOUNTER (OUTPATIENT)
Dept: ULTRASOUND IMAGING | Age: 62
Discharge: HOME | End: 2022-10-15
Payer: COMMERCIAL

## 2022-10-13 DIAGNOSIS — Q44.6 POLYCYSTIC LIVER DISEASE: ICD-10-CM

## 2022-10-13 DIAGNOSIS — K82.4 POLYP OF GALLBLADDER: ICD-10-CM

## 2022-10-13 PROCEDURE — 76705 ECHO EXAM OF ABDOMEN: CPT

## 2022-10-14 ENCOUNTER — OFFICE VISIT (OUTPATIENT)
Dept: HEMATOLOGY | Age: 62
End: 2022-10-14
Payer: COMMERCIAL

## 2022-10-14 VITALS
BODY MASS INDEX: 21.6 KG/M2 | DIASTOLIC BLOOD PRESSURE: 70 MMHG | SYSTOLIC BLOOD PRESSURE: 115 MMHG | HEART RATE: 57 BPM | WEIGHT: 110 LBS | HEIGHT: 60 IN | TEMPERATURE: 97.5 F | OXYGEN SATURATION: 96 % | RESPIRATION RATE: 16 BRPM

## 2022-10-14 DIAGNOSIS — K82.4 GALLBLADDER POLYP: Primary | ICD-10-CM

## 2022-10-14 PROCEDURE — 99213 OFFICE O/P EST LOW 20 MIN: CPT | Performed by: TRANSPLANT SURGERY

## 2022-10-14 NOTE — PROGRESS NOTES
Hepatobiliary and Pancreatic Surgery Attending History and Physical    Patient's Name/Date of Birth: Lali Hair /1960 (67 y.o.)    Date: October 14, 2022     CC: Polycystic liver disease with calcifications    HPI:  Patient is a very pleasant 56year old female whom denies any abdominal pain. Of note on her ultrasound she was found to have a 3mm polyp vs. Impacted gallstone within her gallbladder. She is seeing me in follow up from her ultrasound. Physical Exam:  /70   Pulse 57   Temp 97.5 °F (36.4 °C) (Temporal)   Resp 16   Ht 5' (1.524 m)   Wt 110 lb (49.9 kg)   LMP 07/09/2009   SpO2 96%   BMI 21.48 kg/m²     PSYCH: mood and affect normal, alert and oriented x 3: No apparent distress, comfortable  EYES: Sclera white, pupils equal round and reactive to light  ENMT:  Hearing normal, trachea midline, ears externally intact  LYMPH: no obvious lympadenopathy in neck. RESP: Respiratory effort was normal with no retractions or use of accessory muscles. CV:  No pedal edema  GI/ Abdomen: Soft, nondistended, nontender, no guarding, no peritoneal signs  MSK: no clubbing/ no cyanosis/ gaitnormal       Assessment/Plan:  3mm polyp vs. Impacted gallstone in the gallbladder and polycystic liver disease with calcifications  - I reviewed her images prior to her our office visit and we also reviewed them together today  - likely a stone within her gallbladder  - US yearly due to polyp vs stone    20 Minutes of which greater than 50% was spent counseling or coordinating her care. Thank you for the consultation allowing me to take part in Ms. Mahmood's care. Please send a copy of my note to Dr. Torito Carbajal.  Learta Collet M.D.  10/14/2022  12:20 PM

## 2023-10-12 ENCOUNTER — TELEPHONE (OUTPATIENT)
Dept: HEMATOLOGY | Age: 63
End: 2023-10-12

## 2023-11-09 ENCOUNTER — TELEPHONE (OUTPATIENT)
Dept: HEMATOLOGY | Age: 63
End: 2023-11-09

## 2023-11-09 NOTE — TELEPHONE ENCOUNTER
I called patient to see if she is interested in rescheduling her US and making a follow up. She has the number to call radiology to reschedule the US and stated she will call back to reschedule her follow up after.     Electronically signed by Farhan Ness RN on 11/9/2023 at 10:03 AM

## 2023-11-13 DIAGNOSIS — K82.4 GALLBLADDER POLYP: Primary | ICD-10-CM

## 2023-12-07 ENCOUNTER — HOSPITAL ENCOUNTER (OUTPATIENT)
Dept: ULTRASOUND IMAGING | Age: 63
Discharge: HOME | End: 2023-12-09
Attending: TRANSPLANT SURGERY
Payer: MEDICARE

## 2023-12-07 DIAGNOSIS — K82.4 GALLBLADDER POLYP: ICD-10-CM

## 2023-12-07 PROCEDURE — 76705 ECHO EXAM OF ABDOMEN: CPT

## 2023-12-14 ENCOUNTER — OFFICE VISIT (OUTPATIENT)
Dept: HEMATOLOGY | Age: 63
End: 2023-12-14
Payer: MEDICARE

## 2023-12-14 VITALS
TEMPERATURE: 97.5 F | DIASTOLIC BLOOD PRESSURE: 83 MMHG | BODY MASS INDEX: 25.82 KG/M2 | WEIGHT: 131.5 LBS | OXYGEN SATURATION: 97 % | HEIGHT: 60 IN | SYSTOLIC BLOOD PRESSURE: 132 MMHG | RESPIRATION RATE: 15 BRPM | HEART RATE: 80 BPM

## 2023-12-14 DIAGNOSIS — Z09 FOLLOW-UP EXAM: Primary | ICD-10-CM

## 2023-12-14 DIAGNOSIS — K82.4 GALLBLADDER POLYP: ICD-10-CM

## 2023-12-14 PROCEDURE — 99212 OFFICE O/P EST SF 10 MIN: CPT | Performed by: CLINICAL NURSE SPECIALIST

## 2024-11-01 ENCOUNTER — TELEPHONE (OUTPATIENT)
Dept: HEMATOLOGY | Age: 64
End: 2024-11-01

## 2024-11-01 NOTE — TELEPHONE ENCOUNTER
I scheduled patient for her US at [facility] on 12/3/24 at 10:00am.  I called patient and left a VM that she is due for  her 1 year US and that I got it scheduled and I asked her to call the office back to get the appt information and instructions and make a follow up appt.    Electronically signed by Michelle Lindsey RN on 11/1/2024 at 1:35 PM

## 2024-11-29 ENCOUNTER — OFFICE VISIT (OUTPATIENT)
Dept: HEMATOLOGY | Age: 64
End: 2024-11-29
Payer: COMMERCIAL

## 2024-11-29 ENCOUNTER — HOSPITAL ENCOUNTER (OUTPATIENT)
Dept: ULTRASOUND IMAGING | Age: 64
Discharge: HOME | End: 2024-12-01
Payer: MEDICARE

## 2024-11-29 VITALS
OXYGEN SATURATION: 97 % | RESPIRATION RATE: 15 BRPM | DIASTOLIC BLOOD PRESSURE: 79 MMHG | BODY MASS INDEX: 24.94 KG/M2 | TEMPERATURE: 97.5 F | HEIGHT: 60 IN | HEART RATE: 64 BPM | SYSTOLIC BLOOD PRESSURE: 150 MMHG | WEIGHT: 127 LBS

## 2024-11-29 DIAGNOSIS — K82.4 GALLBLADDER POLYP: Primary | ICD-10-CM

## 2024-11-29 DIAGNOSIS — Z09 FOLLOW-UP EXAM: ICD-10-CM

## 2024-11-29 DIAGNOSIS — K82.4 GALLBLADDER POLYP: ICD-10-CM

## 2024-11-29 PROCEDURE — 1090F PRES/ABSN URINE INCON ASSESS: CPT | Performed by: CLINICAL NURSE SPECIALIST

## 2024-11-29 PROCEDURE — G8399 PT W/DXA RESULTS DOCUMENT: HCPCS | Performed by: CLINICAL NURSE SPECIALIST

## 2024-11-29 PROCEDURE — 99212 OFFICE O/P EST SF 10 MIN: CPT | Performed by: CLINICAL NURSE SPECIALIST

## 2024-11-29 PROCEDURE — G8484 FLU IMMUNIZE NO ADMIN: HCPCS | Performed by: CLINICAL NURSE SPECIALIST

## 2024-11-29 PROCEDURE — 4004F PT TOBACCO SCREEN RCVD TLK: CPT | Performed by: CLINICAL NURSE SPECIALIST

## 2024-11-29 PROCEDURE — G8420 CALC BMI NORM PARAMETERS: HCPCS | Performed by: CLINICAL NURSE SPECIALIST

## 2024-11-29 PROCEDURE — 76705 ECHO EXAM OF ABDOMEN: CPT

## 2024-11-29 PROCEDURE — 1123F ACP DISCUSS/DSCN MKR DOCD: CPT | Performed by: CLINICAL NURSE SPECIALIST

## 2024-11-29 PROCEDURE — 3017F COLORECTAL CA SCREEN DOC REV: CPT | Performed by: CLINICAL NURSE SPECIALIST

## 2024-11-29 PROCEDURE — G8427 DOCREV CUR MEDS BY ELIG CLIN: HCPCS | Performed by: CLINICAL NURSE SPECIALIST

## 2024-11-29 NOTE — PROGRESS NOTES
Hepatobiliary and Pancreatic Surgery Attending History and Physical    Patient's Name/Date of Birth: Ebony Mahmood /1960 (64 y.o.)    Date: November 29, 2024     CC: Polycystic liver disease with calcifications    HPI:  Patient is a very pleasant 63 year old female whom denies any abdominal pain.  Of note on her ultrasound she was found to have a 3mm polyp vs. Impacted gallstone within her gallbladder.  She is seeing me in follow up from her ultrasound.  States she is doing well. No issues. She is dieting, eating less portion, lost 4# since last visit, her goal is to weigh 123#. Denies abd pain, n/v, melena, hematochezia, or unintentional wt loss.       Physical Exam:  BP (!) 150/79 (Site: Right Upper Arm, Position: Sitting, Cuff Size: Large Adult)   Pulse 64   Temp 97.5 °F (36.4 °C)   Resp 15   Ht 1.524 m (5')   Wt 57.6 kg (127 lb)   LMP 07/09/2009   SpO2 97%   BMI 24.80 kg/m²       PSYCH: mood and affect normal, alert and oriented x 3: No apparent distress, comfortable  EYES: Sclera white, pupils equal round and reactive to light  ENMT:  Hearing normal, trachea midline, ears externally intact  LYMPH: no obvious lympadenopathy in neck.   RESP: Respiratory effort was normal with no retractions or use of accessory muscles.  CV:  No pedal edema  GI/ Abdomen: Soft, nondistended, nontender, no guarding, no peritoneal signs  MSK: no clubbing/ no cyanosis/ gaitnormal    Assessment & Plan 3mm polyp vs. Impacted gallstone in the gallbladder and polycystic liver disease with calcifications  - I reviewed her images prior to her our office visit and we also reviewed them together today  - likely a stone within her gallbladder  - US yearly due to polyp vs stone    15 Minutes of which greater than 50% was spent counseling or coordinating her care.    Thank you for the consultation allowing me to take part in Ms. Mahmood's care.     Case discussed. Images reviewed, and plan developed with Dr Morales    Please send a

## 2025-05-11 ENCOUNTER — HOSPITAL ENCOUNTER (EMERGENCY)
Age: 65
Discharge: HOME | End: 2025-05-11
Attending: EMERGENCY MEDICINE
Payer: MEDICARE

## 2025-05-11 ENCOUNTER — APPOINTMENT (OUTPATIENT)
Dept: GENERAL RADIOLOGY | Age: 65
End: 2025-05-11
Payer: MEDICARE

## 2025-05-11 VITALS
TEMPERATURE: 98.2 F | SYSTOLIC BLOOD PRESSURE: 140 MMHG | DIASTOLIC BLOOD PRESSURE: 84 MMHG | RESPIRATION RATE: 16 BRPM | WEIGHT: 130 LBS | BODY MASS INDEX: 25.52 KG/M2 | OXYGEN SATURATION: 97 % | HEART RATE: 74 BPM | HEIGHT: 60 IN
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
## 2025-05-11 PROCEDURE — 90471 IMMUNIZATION ADMIN: CPT

## 2025-05-11 PROCEDURE — 6370000000 HC RX 637 (ALT 250 FOR IP)

## 2025-05-11 PROCEDURE — 99284 EMERGENCY DEPT VISIT MOD MDM: CPT

## 2025-05-11 PROCEDURE — 73630 X-RAY EXAM OF FOOT: CPT

## 2025-05-11 PROCEDURE — 90714 TD VACC NO PRESV 7 YRS+ IM: CPT

## 2025-05-11 PROCEDURE — 6360000002 HC RX W HCPCS

## 2025-05-11 RX ORDER — CEPHALEXIN 500 MG/1
500 CAPSULE ORAL 4 TIMES DAILY
Qty: 28 CAPSULE | Refills: 0 | Status: SHIPPED | OUTPATIENT
Start: 2025-05-11 | End: 2025-05-18

## 2025-05-11 RX ORDER — ACETAMINOPHEN 325 MG/1
650 TABLET ORAL ONCE
Status: COMPLETED | OUTPATIENT
Start: 2025-05-11 | End: 2025-05-11

## 2025-05-11 RX ADMIN — ACETAMINOPHEN 650 MG: 325 TABLET ORAL at 05:51

## 2025-05-11 RX ADMIN — CLOSTRIDIUM TETANI TOXOID ANTIGEN (FORMALDEHYDE INACTIVATED) AND CORYNEBACTERIUM DIPHTHERIAE TOXOID ANTIGEN (FORMALDEHYDE INACTIVATED) 0.5 ML: 5; 2 INJECTION, SUSPENSION INTRAMUSCULAR at 05:49

## 2025-05-11 ASSESSMENT — PAIN DESCRIPTION - LOCATION: LOCATION: FOOT

## 2025-05-11 ASSESSMENT — PAIN DESCRIPTION - DESCRIPTORS: DESCRIPTORS: BURNING

## 2025-05-11 ASSESSMENT — PAIN SCALES - GENERAL: PAINLEVEL_OUTOF10: 3

## 2025-05-11 ASSESSMENT — PAIN DESCRIPTION - FREQUENCY: FREQUENCY: CONTINUOUS

## 2025-05-11 ASSESSMENT — PAIN DESCRIPTION - ORIENTATION: ORIENTATION: LEFT

## 2025-05-11 ASSESSMENT — PAIN DESCRIPTION - PAIN TYPE: TYPE: ACUTE PAIN

## 2025-05-11 NOTE — ED PROVIDER NOTES
SEP-1 Core Measure due to:  Infection is not suspected     PHYSICAL EXAM  1 or more Elements     ED Triage Vitals   BP Systolic BP Percentile Diastolic BP Percentile Temp Temp Source Pulse Respirations SpO2   05/11/25 0432 -- -- 05/11/25 0432 05/11/25 0432 05/11/25 0432 05/11/25 0432 05/11/25 0432   (!) 141/93   97.9 °F (36.6 °C) Oral 89 16 98 %      Height Weight - Scale         05/11/25 0431 05/11/25 0431         1.524 m (5') 59 kg (130 lb)             Constitutional/General: Alert and oriented x3, no acute distress  Head: Normocephalic and atraumatic  Eyes: PERRL, EOMI, conjunctiva normal, sclera non icteric  Respiratory: Lungs clear to auscultation bilaterally, no wheezes, rales, or rhonchi. Not in respiratory distress  Cardiovascular:  Regular rate. Regular rhythm. 2+ distal pulses. Equal extremity pulses  Chest: No chest wall tenderness  GI:  Abdomen Soft, Non tender, Non distended.  No rebound, guarding, or rigidity  Musculoskeletal: Moves all extremities x 4. Warm and well perfused, no cyanosis, no edema. Capillary refill <3 seconds  Integument: skin warm and dry. No rashes, small erythematous region with surrounding edema on dorsum of left foot with no necrotic tissue and normal capillary refill distal is appreciated.  Neurologic: GCS 15, no focal deficits    DIAGNOSTIC RESULTS   LABS:    Labs Reviewed - No data to display    As interpreted by me, the above displayed labs are abnormal. All other labs obtained during this visit were within normal range or not returned as of this dictation.    EKG Interpretation  Interpreted by emergency department resident physician, Chris Mart DO  *Please see ED Course for EKG interpretation*    RADIOLOGY:   Non-plain film images such as CT, Ultrasound and MRI are read by the radiologist. Plain radiographic images are visualized and preliminarily interpreted by the ED Provider with the below findings:    My interpretation of left foot x-ray: No acute fracture or

## 2025-05-11 NOTE — DISCHARGE INSTRUCTIONS
Please call and follow-up with your family doctor soon as possible.  Take Keflex as discussed.  Return to emergency department if you have red streaking, pus coming from wound, or fevers.